# Patient Record
(demographics unavailable — no encounter records)

---

## 2019-02-08 NOTE — RADIOLOGY REPORT (SQ)
EXAM DESCRIPTION:  CT HEAD WITHOUT



COMPLETED DATE/TIME:  2/8/2019 9:43 am



REASON FOR STUDY:  DIZZINESS (R42) R42  DIZZINESS AND GIDDINESS



COMPARISON:  None.



TECHNIQUE:  Axial images acquired through the brain without intravenous contrast.  Images reviewed wi
th bone, brain and subdural windows.  Additional sagittal and coronal reconstructions were generated.
 Images stored on PACS.

All CT scanners at this facility use dose modulation, iterative reconstruction, and/or weight based d
osing when appropriate to reduce radiation dose to as low as reasonably achievable (ALARA).

[facility]: Dose Right  [facility]: CareDose    [facility]: Dose Right    [facility]: Teradose 4D    [facility]: Smart Technologies



RADIATION DOSE:  CT Rad equipment meets quality standard of care and radiation dose reduction techniq
ues were employed. CTDIvol: 48.8 mGy. DLP: 1079 mGy-cm.mGy.



LIMITATIONS:  None.



FINDINGS:  VENTRICLES: Prominent.

CEREBRUM: No masses.  No hemorrhage.  No midline shift.  Areas of low density in the white matter mos
t likely due to chronic micro-vascular ischemic change.  No evidence for acute infarction.

CEREBELLUM: No masses.  No hemorrhage.  No alteration of density.  No evidence for acute infarction.

EXTRAAXIAL SPACES: Age-related involutional change.  No fluid collections.  No masses.

ORBITS AND GLOBE: No intra- or extraconal masses.  Normal contour of globe without masses.

CALVARIUM: No fracture.

PARANASAL SINUSES: Mucosal thickening bilateral maxillary sinuses.

SOFT TISSUES: No mass or hematoma.

OTHER: No other significant finding.



IMPRESSION:  CHRONIC CHANGES OF ATROPHY AND MICROVASCULAR ISCHEMIA.  NO ACUTE PROCESS.

EVIDENCE OF ACUTE STROKE: NO.



TECHNICAL DOCUMENTATION:  JOB ID:  [id number]

Quality ID # 436: Final reports with documentation of one or more dose reduction techniques (e.g., Au
tomated exposure control, adjustment of the mA and/or kV according to patient size, use of iterative 
reconstruction technique)

 2011 [employer]- All Rights Reserved



Reading location - IP/workstation name: [email or web address]

## 2019-02-21 NOTE — RADIOLOGY REPORT (SQ)
EXAM DESCRIPTION:  CAROTID DOPPLER



COMPLETED DATE/TIME:  2/21/2019 11:20 am



REASON FOR STUDY:  DIZZINESS AND GIDDINESS R42  DIZZINESS AND GIDDINESS



COMPARISON:  None.



TECHNIQUE:  Grayscale ultrasound, Doppler velocity and spectra, and color Doppler images acquired of 
the extra-cranial carotid and vertebral arteries. Images stored on PACS.



LIMITATIONS:  None.



FINDINGS:  RIGHT CAROTID

CCA Velocities: Within normal limits.

ICA Velocities

Peak systolic 100 cm/s.

End diastolic 23 cm/s.

Proximal ICA/CCA peak systolic ratio 1.61.

Spectra normal. No significant plaque.

LEFT CAROTID

CCA Velocities: Within normal limits.

ICA Velocities

Peak systolic 89 cm/s.

End diastolic 32 cm/s.

Proximal ICA/CCA peak systolic ratio 1.46.

There is plaque in the carotid bulb and proximal external carotid.

VERTEBRAL ARTERIES: Antegrade flow.  Normal waveforms.

SUBCLAVIAN ARTERIES: No finding.

OTHER: No other significant finding.



IMPRESSION:  NO HEMODYNAMICALLY SIGNIFICANT STENOSIS.



COMMENT:  Quality ID #195:  Velocity criteria are extrapolated from the diameter data as defined by t
he Society of Radiologists in Ultrasound Consensus Conference. Radiology 2003: 229; 340-346.



TECHNICAL DOCUMENTATION:  JOB ID:  [id number]

 2011 [employer]- All Rights Reserved



Reading location - IP/workstation name: [email or web address]

## 2019-03-27 NOTE — RADIOLOGY REPORT (SQ)
EXAM DESCRIPTION:  CT CHEST WITHOUT



COMPLETED DATE/TIME:  3/27/2019 10:11 am



REASON FOR STUDY:  COPD, USNPECIFIED J44.9  CHRONIC OBSTRUCTIVE PULMONARY DISEASE, UNSPECIFIED



COMPARISON:  None.



TECHNIQUE:  CT scan performed of the chest without intravenous contrast.  Images reviewed with lung, 
soft tissue and bone windows.  Reconstructed coronal and sagittal MPR images reviewed.  All images st
ored on PACS.

All CT scanners at this facility use dose modulation, iterative reconstruction, and/or weight based d
osing when appropriate to reduce radiation dose to as low as reasonably achievable (ALARA).

[facility]: Dose Right  [facility]: CareDose    [facility]: Dose Right    [facility]: Teradose 4D    [facility]: Smart [employer]



RADIATION DOSE:  CT Rad equipment meets quality standard of care and radiation dose reduction techniq
ues were employed. CTDIvol: 5.2 mGy. DLP: 220 mGy-cm. mGy.



LIMITATIONS:  No technical limitations.



FINDINGS:  LUNGS AND PLEURA: Enlarged airspaces are present in the upper lobes bilaterally from centr
ilobular emphysema.

In the right middle lobe near the minor fissure, a cluster of calcified granulomas are present, benig
n.  This is best shown on axial image 94 and sagittal reconstruction image 22.

In the left lower lobe, an 8 to 9 mm subpleural noncalcified nodule is present on axial image 75.

In the left lower lobe, a 6 mm noncalcified subpleural nodule is present on axial image 90.

No fluffy alveolar infiltrates worrisome for edema or pneumonia.  No pleural effusions.  No pneumotho
rax.

HILAR AND MEDIASTINAL STRUCTURES: Calcified right hilar lymph nodes are present from old granulomatou
s disease.  No identified masses or abnormal nodes.  No obvious aneurysm.

HEART AND VASCULAR STRUCTURES: No aneurysm.  No pericardial effusion.  Mild aortic valve and proximal
 left coronary calcification

UPPER ABDOMEN: No significant findings.  Limited exam.

THYROID AND OTHER SOFT TISSUES: No masses.  No adenopathy.

BONES: No significant finding.

HARDWARE: None in the chest.

OTHER: No other significant findings.



IMPRESSION:  Obstructive lung disease at the apices.

Benign old granulomatous disease in the right middle lobe and right hilum

Subcentimeter subpleural noncalcified nodules in the left lower lobe for which follow-up CT is recomm
ended



COMMENT:   FLEISCHNER CRITERIA FOR FOLLOW-UP OF PULMONARY NODULES

Incidentally detected new nodules in persons 35 or older.

HIGH RISK: History of smoking or other known risk factors.

6-8mm single solid nodule: LOW RISK: CT 6-12 mo; then consider CT 18-24 mo. HIGH RISK: CT 6-12 mo; th
en CT 18-24 mo.



TECHNICAL DOCUMENTATION:  JOB ID:  [id number]

Quality ID # 436: Final reports with documentation of one or more dose reduction techniques (e.g., Au
tomated exposure control, adjustment of the mA and/or kV according to patient size, use of iterative 
reconstruction technique)

 2011 [employer]- All Rights Reserved



Reading location - IP/workstation name: [email or web address]